# Patient Record
Sex: FEMALE | Race: WHITE
[De-identification: names, ages, dates, MRNs, and addresses within clinical notes are randomized per-mention and may not be internally consistent; named-entity substitution may affect disease eponyms.]

---

## 2018-04-06 ENCOUNTER — HOSPITAL ENCOUNTER (EMERGENCY)
Dept: HOSPITAL 80 - FED | Age: 48
LOS: 1 days | Discharge: HOME | End: 2018-04-07
Payer: COMMERCIAL

## 2018-04-06 DIAGNOSIS — E86.9: ICD-10-CM

## 2018-04-06 DIAGNOSIS — S50.02XA: ICD-10-CM

## 2018-04-06 DIAGNOSIS — I10: ICD-10-CM
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
## 2018-04-06 DIAGNOSIS — S39.012A: Primary | ICD-10-CM

## 2018-04-06 DIAGNOSIS — I72.2: ICD-10-CM
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
## 2018-04-07 VITALS — DIASTOLIC BLOOD PRESSURE: 82 MMHG | SYSTOLIC BLOOD PRESSURE: 121 MMHG

## 2018-04-09 NOTE — ASMTCMCOM
CM Note

 

CM Note                       

Notes:

Received a note from the overnight ED provider, requesting to assist patient with followup care 

coordination. Patient is followed by Wiley Cuellar at [facility] in [place] 

([phone]); called their backline ([phone]) and spoke with an RN re:pt needing follow up 

as soon as possible this week and an outpatient CTA of her abdomen and pelvis; the RN said she will 


look into scheduling patient and will contact patient directly today. This CM provided ED CM # for 

any further questions or needed assistance. 

 

Date Signed:  04/09/2018 10:28 AM

Electronically Signed By:Shefali Pillai RN

## 2018-04-09 NOTE — ASDISCHSUM
----------------------------------------------

Discharge Information

----------------------------------------------

Plan Status:Home with No Needs                       Medically Cleared to Leave:

Discharge Date:04/07/2018 01:05 AM                   CM D/C Disposition:Home, Routine, Self-Care

ADT D/C Disposition:Home, Routine, Self-Care         Projected Discharge Date:04/07/2018 01:05 AM

Transportation at D/C:Family                         Discharge Delay Reason:

Follow-Up Date:04/07/2018 01:05 AM                   Discharge Slot:

Final Diagnosis:

----------------------------------------------

Placement Information

----------------------------------------------

----------------------------------------------

Patient Contact Information

----------------------------------------------

Contact Name:THELMA                           Relationship:

Address:[address]                          Home Phone:

                                                     Work [phone]

City:[place]                                      Alternate Phone:

[facility]/Zip Code:[place] [address]                              Email:

----------------------------------------------

Financial Information

----------------------------------------------

Financial Class:Commercial

Primary Plan Desc:[employer]                   Primary Plan Number:[id number]

Secondary Plan Desc:WE CARE                          Secondary Plan Number:[id number]

 

 

----------------------------------------------

Assessment Information

----------------------------------------------

----------------------------------------------

[facility] CM Progress Note

----------------------------------------------

CM Note

 

CM Note                       

Notes:

Received a note from the overnight ED provider, requesting to assist patient with followup care 

coordination. Patient is followed by Wiley Cuellar at [facility] in [place] 

([phone]); called their backline ([phone]) and spoke with an RN re:pt needing follow up 

as soon as possible this week and an outpatient CTA of her abdomen and pelvis; the RN said she will 


look into scheduling patient and will contact patient directly today. This CM provided ED CM # for 

any further questions or needed assistance. 

 

Date Signed:  04/09/2018 10:28 AM

Electronically Signed By:Shefali Pillai RN

 

 

----------------------------------------------

Intervention Information

----------------------------------------------

Intervention Type:Post Acute Communication           Date of Service:04/09/2018 10:28 AM

Patient Type:Emergency Room                          Staff Member:MANN Pillai Sharon

Hours:0.5                                            Discipline:

Severity:                                            Comment:Contacted pt's PCP at [facility] in [facility] 

                                                              re: follow-up appts and outpt CTA